# Patient Record
Sex: FEMALE | Race: OTHER | Employment: OTHER | URBAN - NONMETROPOLITAN AREA
[De-identification: names, ages, dates, MRNs, and addresses within clinical notes are randomized per-mention and may not be internally consistent; named-entity substitution may affect disease eponyms.]

---

## 2017-01-04 ENCOUNTER — OFFICE VISIT (OUTPATIENT)
Dept: VASCULAR SURGERY | Age: 57
End: 2017-01-04
Payer: COMMERCIAL

## 2017-01-04 VITALS
SYSTOLIC BLOOD PRESSURE: 132 MMHG | TEMPERATURE: 98.6 F | RESPIRATION RATE: 18 BRPM | HEART RATE: 84 BPM | DIASTOLIC BLOOD PRESSURE: 78 MMHG

## 2017-01-04 DIAGNOSIS — I65.23 BILATERAL CAROTID ARTERY STENOSIS: Primary | ICD-10-CM

## 2017-01-04 PROCEDURE — 99212 OFFICE O/P EST SF 10 MIN: CPT | Performed by: PHYSICIAN ASSISTANT

## 2017-04-04 ENCOUNTER — HOSPITAL ENCOUNTER (OUTPATIENT)
Dept: VASCULAR LAB | Age: 57
Discharge: HOME | End: 2017-04-04
Payer: COMMERCIAL

## 2017-04-04 ENCOUNTER — OFFICE VISIT (OUTPATIENT)
Dept: VASCULAR SURGERY | Age: 57
End: 2017-04-04
Payer: COMMERCIAL

## 2017-04-04 VITALS
OXYGEN SATURATION: 97 % | SYSTOLIC BLOOD PRESSURE: 116 MMHG | HEART RATE: 84 BPM | TEMPERATURE: 98 F | DIASTOLIC BLOOD PRESSURE: 76 MMHG | RESPIRATION RATE: 18 BRPM

## 2017-04-04 DIAGNOSIS — I65.23 BILATERAL CAROTID ARTERY STENOSIS: Primary | ICD-10-CM

## 2017-04-04 DIAGNOSIS — I65.23 BILATERAL CAROTID ARTERY STENOSIS: ICD-10-CM

## 2017-04-04 PROCEDURE — 93880 EXTRACRANIAL BILAT STUDY: CPT

## 2017-04-04 PROCEDURE — 99213 OFFICE O/P EST LOW 20 MIN: CPT | Performed by: PHYSICIAN ASSISTANT

## 2017-04-04 RX ORDER — PANTOPRAZOLE SODIUM 40 MG/1
40 TABLET, DELAYED RELEASE ORAL DAILY
COMMUNITY

## 2017-04-04 RX ORDER — HYDROCHLOROTHIAZIDE 12.5 MG/1
12.5 CAPSULE, GELATIN COATED ORAL DAILY
COMMUNITY
End: 2019-03-19

## 2017-04-04 RX ORDER — LOSARTAN POTASSIUM AND HYDROCHLOROTHIAZIDE 25; 100 MG/1; MG/1
1 TABLET ORAL DAILY
COMMUNITY

## 2018-10-17 DIAGNOSIS — I65.23 BILATERAL CAROTID ARTERY STENOSIS: Primary | ICD-10-CM

## 2018-11-19 ENCOUNTER — HOSPITAL ENCOUNTER (OUTPATIENT)
Dept: VASCULAR LAB | Age: 58
Discharge: HOME | End: 2018-11-19
Payer: COMMERCIAL

## 2018-11-19 DIAGNOSIS — I65.23 BILATERAL CAROTID ARTERY STENOSIS: ICD-10-CM

## 2018-11-19 PROCEDURE — 93880 EXTRACRANIAL BILAT STUDY: CPT

## 2019-03-06 ENCOUNTER — TELEPHONE (OUTPATIENT)
Dept: NEUROLOGY | Age: 59
End: 2019-03-06

## 2019-03-19 ENCOUNTER — OFFICE VISIT (OUTPATIENT)
Dept: NEUROLOGY | Age: 59
End: 2019-03-19
Payer: COMMERCIAL

## 2019-03-19 VITALS
WEIGHT: 166 LBS | HEART RATE: 92 BPM | HEIGHT: 59 IN | BODY MASS INDEX: 33.47 KG/M2 | DIASTOLIC BLOOD PRESSURE: 80 MMHG | SYSTOLIC BLOOD PRESSURE: 151 MMHG

## 2019-03-19 DIAGNOSIS — R68.89 SPELLS OF DECREASED ATTENTIVENESS: ICD-10-CM

## 2019-03-19 DIAGNOSIS — I63.232 STENOSIS OF LEFT INTERNAL CAROTID ARTERY WITH CEREBRAL INFARCTION (HCC): ICD-10-CM

## 2019-03-19 DIAGNOSIS — R41.89 COGNITIVE DEFICITS: ICD-10-CM

## 2019-03-19 DIAGNOSIS — I63.032 CEREBROVASCULAR ACCIDENT (CVA) DUE TO THROMBOSIS OF LEFT CAROTID ARTERY (HCC): Primary | ICD-10-CM

## 2019-03-19 PROCEDURE — 99214 OFFICE O/P EST MOD 30 MIN: CPT | Performed by: PSYCHIATRY & NEUROLOGY

## 2019-03-19 RX ORDER — FLUTICASONE PROPIONATE 50 MCG
1 SPRAY, SUSPENSION (ML) NASAL DAILY
COMMUNITY

## 2019-03-19 RX ORDER — ESCITALOPRAM OXALATE 10 MG/1
10 TABLET ORAL DAILY
COMMUNITY

## 2019-03-19 RX ORDER — PHENOL 1.4 %
1 AEROSOL, SPRAY (ML) MUCOUS MEMBRANE DAILY
COMMUNITY

## 2019-04-02 ENCOUNTER — HOSPITAL ENCOUNTER (OUTPATIENT)
Dept: NEUROLOGY | Age: 59
Discharge: HOME | End: 2019-04-02
Payer: COMMERCIAL

## 2019-04-02 DIAGNOSIS — I63.232 STENOSIS OF LEFT INTERNAL CAROTID ARTERY WITH CEREBRAL INFARCTION (HCC): ICD-10-CM

## 2019-04-02 DIAGNOSIS — R41.89 COGNITIVE DEFICITS: ICD-10-CM

## 2019-04-02 DIAGNOSIS — I63.032 CEREBROVASCULAR ACCIDENT (CVA) DUE TO THROMBOSIS OF LEFT CAROTID ARTERY (HCC): ICD-10-CM

## 2019-04-02 DIAGNOSIS — R68.89 SPELLS OF DECREASED ATTENTIVENESS: ICD-10-CM

## 2019-04-02 PROCEDURE — 95813 EEG EXTND MNTR 61-119 MIN: CPT | Performed by: PSYCHIATRY & NEUROLOGY

## 2019-04-02 PROCEDURE — 95816 EEG AWAKE AND DROWSY: CPT

## 2019-04-22 NOTE — PROCEDURES
Patient:   Marlene Carbajal  MR#:    [id number]   Room:    Room/bed info not found   YOB: 1960  Date of Progress Note: 4/22/2019  Time of Note                           1:25 PM  Consulting Physician:   Anabell Elder M.D. Attending Physician:  No att. providers found         This is a multichannel digital EEG recording using the international 10-20 placement system. Technical Summary:     Background EEG activity: The occipital dominant rhythm is 9-10 Hz. There is much low to moderate voltage 8-10 Hz activity seen in a generalized distribution intermixed with low voltage 18-22 Hz activity. Photic Stimulation: No abnormal waveforms are noted with various frequencies of flickering light. Sleep:No abnormal waveforms noted during periods of sleep. IMPRESSION:   This is a normal awake and asleep EEG. No clear epileptiform activity was noted during the recording period.       Dr Sabi Poon Certified in Neurology  Board Certified in Clinical Neurophysiology  Fellowship trained in [address] [place] [address] Time 62 minutes

## 2019-05-08 ENCOUNTER — HOSPITAL ENCOUNTER (OUTPATIENT)
Dept: CT IMAGING | Age: 59
Discharge: HOME | End: 2019-05-08
Payer: COMMERCIAL

## 2019-05-08 ENCOUNTER — HOSPITAL ENCOUNTER (OUTPATIENT)
Dept: MRI IMAGING | Age: 59
Discharge: HOME | End: 2019-05-08
Payer: COMMERCIAL

## 2019-05-08 DIAGNOSIS — I63.232 STENOSIS OF LEFT INTERNAL CAROTID ARTERY WITH CEREBRAL INFARCTION (HCC): ICD-10-CM

## 2019-05-08 DIAGNOSIS — R41.89 COGNITIVE DEFICITS: ICD-10-CM

## 2019-05-08 DIAGNOSIS — I63.032 CEREBROVASCULAR ACCIDENT (CVA) DUE TO THROMBOSIS OF LEFT CAROTID ARTERY (HCC): ICD-10-CM

## 2019-05-08 DIAGNOSIS — R68.89 SPELLS OF DECREASED ATTENTIVENESS: ICD-10-CM

## 2019-05-08 LAB
GFR NON-AFRICAN AMERICAN: >60
PERFORMED ON: NORMAL
POC CREATININE: 0.9 MG/DL (ref 0.3–1.3)
POC SAMPLE TYPE: NORMAL

## 2019-05-08 PROCEDURE — 82565 ASSAY OF CREATININE: CPT

## 2019-05-08 PROCEDURE — 6360000004 HC RX CONTRAST MEDICATION: Performed by: PSYCHIATRY & NEUROLOGY

## 2019-05-08 PROCEDURE — 70551 MRI BRAIN STEM W/O DYE: CPT

## 2019-05-08 PROCEDURE — 70498 CT ANGIOGRAPHY NECK: CPT

## 2019-05-08 PROCEDURE — 70496 CT ANGIOGRAPHY HEAD: CPT

## 2019-05-08 RX ADMIN — IOPAMIDOL 90 ML: 755 INJECTION, SOLUTION INTRAVENOUS at 11:40

## 2019-05-09 DIAGNOSIS — I63.232 STENOSIS OF LEFT INTERNAL CAROTID ARTERY WITH CEREBRAL INFARCTION (HCC): Primary | ICD-10-CM

## 2019-05-29 ENCOUNTER — OFFICE VISIT (OUTPATIENT)
Dept: VASCULAR SURGERY | Age: 59
End: 2019-05-29
Payer: COMMERCIAL

## 2019-05-29 VITALS
RESPIRATION RATE: 18 BRPM | TEMPERATURE: 97.3 F | OXYGEN SATURATION: 98 % | SYSTOLIC BLOOD PRESSURE: 140 MMHG | HEART RATE: 89 BPM | DIASTOLIC BLOOD PRESSURE: 77 MMHG

## 2019-05-29 DIAGNOSIS — I65.23 BILATERAL CAROTID ARTERY STENOSIS: Primary | ICD-10-CM

## 2019-05-29 PROCEDURE — 99212 OFFICE O/P EST SF 10 MIN: CPT | Performed by: NURSE PRACTITIONER

## 2019-05-29 RX ORDER — CYCLOBENZAPRINE HCL 10 MG
10 TABLET ORAL 3 TIMES DAILY PRN
COMMUNITY

## 2019-05-29 NOTE — PROGRESS NOTES
Patient Care Team:  Lilian Frias as PCP - [facility] [address], MD as Consulting Physician (Neurology)      Subjective    She presents for follow-up of carotid artery stenosis. She has prior history of carotid artery stenosis for 1 - 5 years. Her current treatment includes clopidogrel 75 mg po qd, ASA EC daily. She had CVA in 2016.    She reports episodes of word finding difficulty over the last year and some right sided weakness  Apoorva Lopez is a 59 y.o. female with the following history reviewed and recorded in [facility]:  Patient Active Problem List    Diagnosis Date Noted    Spells of decreased attentiveness 03/19/2019    Cognitive deficits 03/19/2019    Stenosis of left internal carotid artery with cerebral infarction ([facility] [address] 12/19/2016    Cerebrovascular accident (CVA) due to thrombosis of left carotid artery ([facility] [address] 12/16/2016    Weakness 12/16/2016    Aphasia 12/16/2016    Bilateral carotid artery stenosis 12/14/2016    CVA (cerebral vascular accident) ([facility] [address] 12/14/2016    Asthma     Acid reflux     Hyperlipidemia     Hypertension     Diabetes ([facility] [address]      type 2       Current Outpatient Medications   Medication Sig Dispense Refill    cyclobenzaprine (FLEXERIL) 10 MG tablet Take 10 mg by mouth 3 times daily as needed for Muscle spasms      diclofenac sodium 1 % GEL Apply 2 g topically 2 times daily      fluticasone (FLONASE) 50 MCG/ACT nasal spray 1 spray by Each Nare route daily      Probiotic Product (PROBIOTIC-10 PO) Take by mouth      calcium carbonate 600 MG TABS tablet Take 1 tablet by mouth daily      escitalopram (LEXAPRO) 10 MG tablet Take 10 mg by mouth daily      losartan-hydrochlorothiazide (HYZAAR) 100-25 MG per tablet Take 1 tablet by mouth daily      pantoprazole (PROTONIX) 40 MG tablet Take 40 mg by mouth daily      metFORMIN (GLUCOPHAGE) 1000 MG tablet Take 1 tablet by mouth 2 times daily (with meals) 60 tablet 3    acetaminophen (TYLENOL) 500 MG tablet Take 500 mg by mouth every 6 hours as needed for Pain      pravastatin (PRAVACHOL) 40 MG tablet Take 40 mg by mouth Indications: 1.5 daily       sitaGLIPtin (JANUVIA) 100 MG tablet Take 100 mg by mouth daily      docusate sodium (COLACE) 100 MG capsule Take 100 mg by mouth 2 times daily as needed       clopidogrel (PLAVIX) 75 MG tablet Take 75 mg by mouth daily      glimepiride (AMARYL) 2 MG tablet Take 2 mg by mouth every morning (before breakfast)      aspirin 325 MG tablet Take 325 mg by mouth daily      amLODIPine (NORVASC) 10 MG tablet Take 10 mg by mouth daily       No current facility-administered medications for this visit.       Current Outpatient Medications on File Prior to Visit   Medication Sig Dispense Refill    cyclobenzaprine (FLEXERIL) 10 MG tablet Take 10 mg by mouth 3 times daily as needed for Muscle spasms      diclofenac sodium 1 % GEL Apply 2 g topically 2 times daily      fluticasone (FLONASE) 50 MCG/ACT nasal spray 1 spray by Each Nare route daily      Probiotic Product (PROBIOTIC-10 PO) Take by mouth      calcium carbonate 600 MG TABS tablet Take 1 tablet by mouth daily      escitalopram (LEXAPRO) 10 MG tablet Take 10 mg by mouth daily      losartan-hydrochlorothiazide (HYZAAR) 100-25 MG per tablet Take 1 tablet by mouth daily      pantoprazole (PROTONIX) 40 MG tablet Take 40 mg by mouth daily      metFORMIN (GLUCOPHAGE) 1000 MG tablet Take 1 tablet by mouth 2 times daily (with meals) 60 tablet 3    acetaminophen (TYLENOL) 500 MG tablet Take 500 mg by mouth every 6 hours as needed for Pain      pravastatin (PRAVACHOL) 40 MG tablet Take 40 mg by mouth Indications: 1.5 daily       sitaGLIPtin (JANUVIA) 100 MG tablet Take 100 mg by mouth daily      docusate sodium (COLACE) 100 MG capsule Take 100 mg by mouth 2 times daily as needed       clopidogrel (PLAVIX) 75 MG tablet Take 75 mg by mouth daily      glimepiride (AMARYL) 2 MG tablet Take 2 mg by mouth every morning (before use: No           Review of Systems    Constitutional - no significant activity change, appetite change, or unexpected weight change. No fever or chills. No diaphoresis or significant fatigue. HENT - no significant rhinorrhea or epistaxis. No tinnitus or significant hearing loss. Eyes - no sudden vision change or amaurosis. Respiratory - no significant shortness of breath, wheezing, or stridor. No apnea, cough, or chest tightness associated with shortness of breath. Cardiovascular - no chest pain, syncope, or significant dizziness. No palpitations or significant leg swelling. No claudication. Gastrointestinal - no abdominal swelling or pain. No blood in stool. No severe constipation, diarrhea, nausea, or vomiting. Genitourinary - No difficulty urinating, dysuria, frequency, or urgency. No flank pain or hematuria. Musculoskeletal - no back pain, gait disturbance, or myalgia. Skin - no color change, rash, pallor, or new wound. Neurologic - no dizziness, facial asymmetry, or light headedness. No seizures. No speech difficulty or lateralizing weakness. Hematologic - no easy bruising or excessive bleeding. Psychiatric - no severe anxiety or nervousness. No confusion. All other review of systems are negative. Physical Exam    BP (!) 140/77 Comment: left  Pulse 89   Temp 97.3 °F (36.3 °C)   Resp 18   SpO2 98%     Constitutional - well developed, well nourished. No diaphoresis or acute distress. HENT - head normocephalic. Right external ear canal appears normal.  Left external ear canal appears normal.  Septum appears midline. Eyes - conjunctiva normal.  EOMS normal.  No exudate. No icterus. Neck- ROM appears normal, no tracheal deviation. Cardiovascular - Regular rate and rhythm. Heart sounds are normal.  No murmur, rub, or gallop. Carotid pulses are 2+ to palpation bilaterally without bruit. Pulmonary - effort appears normal.  No respiratory distress.     Lungs - Breath sounds normal. No wheezes or rales. Extremities - Radial and brachial pulses are 2+ to palpation bilaterally. Neurologic - alert and oriented X 3. Physiologic. Face symmetric. Skin - warm, dry, and intact. No rash, erythema, or pallor. Psychiatric - mood, affect, and behavior appear normal.  Judgment and thought processes appear normal.    Risk factors for atherosclerosis of all vascular beds have been reviewed with the patient including:  Family history, tobacco abuse in all forms, elevated cholesterol, hyperlipidemia, and diabetes. CTA neck -   1. There is a left carotid stent extending from the distal left common   carotid artery into the internal carotid artery. Stent appears patent,   however luminal stenosis of the mid to distal stent is considered. The   internal carotid artery immediately above the stent is widely patent. Please refer to CT head report for distal left internal carotid artery   narrowing. 2. No significant right extracranial carotid artery stenosis. 3. Hypoplastic right vertebral artery, congenital finding     CTA head -   1. Small vertebrobasilar system with congenital anomalies described   above. Focal fenestration the mid basilar artery. 2. Distal left internal carotid artery stenoses described above within   the clinoid and cavernous segments. 3. No central vessel occlusion. No aneurysm or dissection     Individual images were reviewed. Results were reviewed with the patient. MRI -   1. No evidence of acute ischemia. 2. FLAIR signal abnormalities which are nonspecific but most often   seen as sequela of chronic microvascular ischemia         Options have been discussed with the patient including continued medical management. Patient has opted to proceed with continued medical management. Assessment    1.  Bilateral carotid artery stenosis          Plan    Dr. Rose Proffer to review films  Continue asa, plavix, statin  Strongly encourage start/continue statin therapy  Recommend no smoking  Patient instructed to call or proceed to the emergency room with any symptoms of lateralizing weakness, loss of vision in one eye, or episodes slurred speech. Addendum - Dr. Theresa Gamboa reviewed her images. He does not think the stent has enough stenosis to require intervention. He recommends continued medical management. She will need 6 months with repeat cvls.

## 2019-06-03 ENCOUNTER — TELEPHONE (OUTPATIENT)
Dept: VASCULAR SURGERY | Age: 59
End: 2019-06-03

## 2019-06-03 NOTE — TELEPHONE ENCOUNTER
----- Message from ASHANTI Gibson sent at 6/3/2019 11:25 AM CDT -----  Dr. Johnanna Galeazzi reviewed her images. He does not think the stent has enough stenosis to require intervention. He recommends continued medical management. She will need 6 months with repeat cvls.

## 2019-06-03 NOTE — TELEPHONE ENCOUNTER
Tried to call Mrs Sheri Haq and had to leave a voicemail with the following information. Dr. Alana Akhtar reviewed her images. He does not think the stent has enough stenosis to require intervention. He recommends continued medical management.   She will need 6 months with repeat cvls.

## 2019-06-24 ENCOUNTER — TELEPHONE (OUTPATIENT)
Dept: NEUROLOGY | Age: 59
End: 2019-06-24

## 2019-08-01 ENCOUNTER — OFFICE VISIT (OUTPATIENT)
Dept: NEUROLOGY | Age: 59
End: 2019-08-01
Payer: COMMERCIAL

## 2019-08-01 VITALS
WEIGHT: 170.38 LBS | BODY MASS INDEX: 34.41 KG/M2 | SYSTOLIC BLOOD PRESSURE: 140 MMHG | HEART RATE: 95 BPM | DIASTOLIC BLOOD PRESSURE: 81 MMHG

## 2019-08-01 DIAGNOSIS — I63.032 CEREBROVASCULAR ACCIDENT (CVA) DUE TO THROMBOSIS OF LEFT CAROTID ARTERY (HCC): Primary | ICD-10-CM

## 2019-08-01 DIAGNOSIS — R41.89 COGNITIVE DEFICITS: ICD-10-CM

## 2019-08-01 DIAGNOSIS — R68.89 SPELLS OF DECREASED ATTENTIVENESS: ICD-10-CM

## 2019-08-01 DIAGNOSIS — I63.232 STENOSIS OF LEFT INTERNAL CAROTID ARTERY WITH CEREBRAL INFARCTION (HCC): ICD-10-CM

## 2019-08-01 PROCEDURE — 99214 OFFICE O/P EST MOD 30 MIN: CPT | Performed by: PSYCHIATRY & NEUROLOGY

## 2019-08-01 NOTE — PROGRESS NOTES
file     Gets together: Not on file     Attends [employer] service: Not on file     Active member of club or organization: Not on file     Attends meetings of clubs or organizations: Not on file     Relationship status: Not on file    Intimate partner violence:     Fear of current or ex partner: Not on file     Emotionally abused: Not on file     Physically abused: Not on file     Forced sexual activity: Not on file   Other Topics Concern    Not on file   Social History Narrative    Not on file       Review of Systems     Constitutional - No fever or chills. No diaphoresis or significant fatigue. HENT -  No tinnitus or significant hearing loss. Eyes - no sudden vision change or eye pain  Respiratory - yes significant shortness of breath or cough  Cardiovascular - yes chest pain No palpitations or significant leg swelling  Gastrointestinal - no abdominal swelling or pain. Genitourinary - No difficulty urinating, dysuria  Musculoskeletal - yes back pain or myalgia. Skin - no color change or rash  Neurologic - No seizures. No lateralizing weakness. Hematologic - yes easy bruising or excessive bleeding. Psychiatric - yes severe anxiety or nervousness. All other review of systems are negative.     Current Outpatient Medications   Medication Sig Dispense Refill    cyclobenzaprine (FLEXERIL) 10 MG tablet Take 10 mg by mouth 3 times daily as needed for Muscle spasms      diclofenac sodium 1 % GEL Apply 2 g topically 2 times daily      fluticasone (FLONASE) 50 MCG/ACT nasal spray 1 spray by Each Nare route daily      Probiotic Product (PROBIOTIC-10 PO) Take by mouth      calcium carbonate 600 MG TABS tablet Take 1 tablet by mouth daily      escitalopram (LEXAPRO) 10 MG tablet Take 10 mg by mouth daily      losartan-hydrochlorothiazide (HYZAAR) 100-25 MG per tablet Take 1 tablet by mouth daily      pantoprazole (PROTONIX) 40 MG tablet Take 40 mg by mouth daily      metFORMIN (GLUCOPHAGE) 1000 MG tablet Date    WBC 9.1 12/19/2016    HGB 12.6 12/19/2016    HCT 38.5 12/19/2016    MCV 88.3 12/19/2016     (H) 12/19/2016     Lab Results   Component Value Date     12/19/2016    K 3.7 12/19/2016     12/19/2016    CO2 23 12/19/2016    BUN 21 (H) 12/19/2016    CREATININE 0.9 05/08/2019    GLUCOSE 148 (H) 12/19/2016    CALCIUM 10.4 (H) 12/19/2016    LABGLOM >60 05/08/2019       Impression   1. No evidence of acute ischemia. 2. FLAIR signal abnormalities which are nonspecific but most often   seen as sequela of chronic microvascular ischemia. Signed by Dr Pallavi Johnson on 5/8/2019 12:27 PM     Impression   1. There is a left carotid stent extending from the distal left common   carotid artery into the internal carotid artery. Stent appears patent,   however luminal stenosis of the mid to distal stent is considered. The   internal carotid artery immediately above the stent is widely patent. Please refer to CT head report for distal left internal carotid artery   narrowing. 2. No significant right extracranial carotid artery stenosis. 3. Hypoplastic right vertebral artery, congenital finding.       Signed by Dr Chalino Garcia on 5/8/2019 1:15 PM       Impression   1. Small vertebrobasilar system with congenital anomalies described   above. Focal fenestration the mid basilar artery. 2. Distal left internal carotid artery stenoses described above within   the clinoid and cavernous segments. 3. No central vessel occlusion. No aneurysm or dissection. Signed by Dr Chalino Garcia on 5/8/2019 12:59 PM         Assessment    ICD-10-CM    1. Cerebrovascular accident (CVA) due to thrombosis of left carotid artery ([facility] [address] E59.701    2. Spells of decreased attentiveness R68.89    3. Cognitive deficits R41.89    4.  Stenosis of left internal carotid artery with cerebral infarction [facility]) F37.627        Her neurological examination today was significant for some mild expressive aphasia and some chronic weakness

## 2019-12-12 ENCOUNTER — HOSPITAL ENCOUNTER (OUTPATIENT)
Dept: VASCULAR LAB | Age: 59
Discharge: HOME | End: 2019-12-12
Payer: COMMERCIAL

## 2019-12-12 ENCOUNTER — OFFICE VISIT (OUTPATIENT)
Dept: VASCULAR SURGERY | Age: 59
End: 2019-12-12
Payer: COMMERCIAL

## 2019-12-12 VITALS
DIASTOLIC BLOOD PRESSURE: 80 MMHG | OXYGEN SATURATION: 98 % | HEART RATE: 89 BPM | RESPIRATION RATE: 18 BRPM | SYSTOLIC BLOOD PRESSURE: 132 MMHG

## 2019-12-12 DIAGNOSIS — I65.23 BILATERAL CAROTID ARTERY STENOSIS: ICD-10-CM

## 2019-12-12 DIAGNOSIS — I65.23 BILATERAL CAROTID ARTERY STENOSIS: Primary | ICD-10-CM

## 2019-12-12 PROCEDURE — 99212 OFFICE O/P EST SF 10 MIN: CPT | Performed by: NURSE PRACTITIONER

## 2019-12-12 PROCEDURE — 93880 EXTRACRANIAL BILAT STUDY: CPT

## 2019-12-12 RX ORDER — MONTELUKAST SODIUM 10 MG/1
10 TABLET ORAL DAILY
Refills: 6 | COMMUNITY
Start: 2019-11-22

## 2019-12-12 RX ORDER — BUDESONIDE AND FORMOTEROL FUMARATE DIHYDRATE 160; 4.5 UG/1; UG/1
2 AEROSOL RESPIRATORY (INHALATION) NIGHTLY
COMMUNITY
Start: 2019-12-04

## 2020-06-23 ENCOUNTER — OFFICE VISIT (OUTPATIENT)
Dept: VASCULAR SURGERY | Age: 60
End: 2020-06-23
Payer: COMMERCIAL

## 2020-06-23 ENCOUNTER — HOSPITAL ENCOUNTER (OUTPATIENT)
Dept: VASCULAR LAB | Age: 60
Discharge: HOME | End: 2020-06-23
Payer: COMMERCIAL

## 2020-06-23 PROCEDURE — 99441 PR PHYS/QHP TELEPHONE EVALUATION 5-10 MIN: CPT | Performed by: NURSE PRACTITIONER

## 2020-06-23 PROCEDURE — 93880 EXTRACRANIAL BILAT STUDY: CPT

## 2020-06-23 NOTE — PROGRESS NOTES
of left common carotid artery with cervical carotid arteriograms and intracerebral arteriograms/Placement of emboshield filter into the distal left internal carotid artery (distal embolic protection device)/Left internal carotid stents (8-6x40, 8-6x30 Abbott Xact    VASCULAR SURGERY  12/19/2016    [facility]-CONT-self expanding nitinol stent)/balloon angioplasty of left carotid stent with 5x20 viatrac balloon/retrieval of emboshield filter/completion left cervical and intracerebral arteriograms/Mynx closure of right common femoral artery puncture site. Family History   Problem Relation Age of Onset    Other Father         sorosis   Smith Arthritis Mother     High Blood Pressure Mother      Social History     Tobacco Use    Smoking status: Never Smoker    Smokeless tobacco: Never Used   Substance Use Topics    Alcohol use: No         Review of Systems    Constitutional - no significant activity change, appetite change, or unexpected weight change. No fever or chills. No diaphoresis or significant fatigue. HENT - no significant rhinorrhea or epistaxis. No tinnitus or significant hearing loss. Eyes - no sudden vision change or amaurosis. Respiratory - no significant shortness of breath, wheezing, or stridor. No apnea, cough, or chest tightness associated with shortness of breath. Cardiovascular - no chest pain, syncope, or significant dizziness. No palpitations or significant leg swelling.  has not had claudication. Gastrointestinal - has not had abdominal swelling or pain. No blood in stool. No severe constipation, diarrhea, nausea, or vomiting. Genitourinary - No difficulty urinating, dysuria, frequency, or urgency. No flank pain or hematuria. Musculoskeletal - has not had back pain, gait disturbance, or myalgia. Skin - no color change, rash, pallor, has not had new wound. Neurologic - no dizziness, facial asymmetry, or light headedness. No seizures.   No speech difficulty or has right side